# Patient Record
Sex: MALE | Race: WHITE | NOT HISPANIC OR LATINO | Employment: OTHER | URBAN - METROPOLITAN AREA
[De-identification: names, ages, dates, MRNs, and addresses within clinical notes are randomized per-mention and may not be internally consistent; named-entity substitution may affect disease eponyms.]

---

## 2018-03-19 ENCOUNTER — OFFICE VISIT CONVERTED (OUTPATIENT)
Dept: SURGERY | Facility: CLINIC | Age: 53
End: 2018-03-19
Attending: SURGERY

## 2018-03-19 ENCOUNTER — CONVERSION ENCOUNTER (OUTPATIENT)
Dept: SURGERY | Facility: CLINIC | Age: 53
End: 2018-03-19

## 2020-12-01 ENCOUNTER — OFFICE VISIT CONVERTED (OUTPATIENT)
Dept: NEUROSURGERY | Facility: CLINIC | Age: 55
End: 2020-12-01
Attending: PHYSICIAN ASSISTANT

## 2021-05-10 NOTE — H&P
History and Physical      Patient Name: Jacoby Klein   Patient ID: [id number]   Sex: Male   YOB: 1965    Primary Care Provider: [facility]   Referring Provider: Darwin Self    Visit Date: December 1, 2020    Provider: Maeve Angeles PA-C   [facility] [facility]   Location Address: [address] [place] [address]  [id number]   Location Phone: [phone]          Chief Complaint  · Back and left leg pain      History Of Present Illness  The patient is a 55 year old /White male, who presents on referral from Darwin Self, for a neurosurgical evaluation of low back pain and left leg pain.   The left leg pain involves the left foot in a nonspecific distribution. The pain developed gradually around 5-6 years ago after he was moving heavy equipment at work while in  service. It is moderate (3-6/10) in severity, has an aching and a sharp quality and radiates into the left foot in a nonspecific distribution. The pain has been constant. The pain tends to be maximal at no specific time, but waxes and wanes in severity throughout the day. The patient states the pain is aggravated by prolonged standing, walking long distances, and staying in one position for extended periods. No alleviating factors are reported.   He also reports paresthesias down to the foot on the left.   RECENT INTERVENTIONS:  He has been previously treated with physical therapy, chiropractic management, pain medication, and muscle relaxants, and gabapentin. The physical therapy was ineffective in relieving the pain. Chriopractic therapy which is temporarily effective.   INFORMATION REVIEWED:  The following information was reviewed: radiology reports. an MRI of the lumbar spine and from 11/2/20 report describes degenerative changes with left L4/5 foraminal disc protrusion. With multilevel spondylolisthesis. Old T12 compression deformity and L1 hemangioma in the vertebral  body.      He now works as a home health nurse.    Also reports hand paresthesias that wakes him up at night for several months now.       Past Medical History  Allergic rhinitis, chronic; Arthritis; High blood pressure; High cholesterol; Kidney stones; Prostate Disorder; Rectal bleeding; Shortness Of Air         Past Surgical History  EYE SURGERY; Teeth extraction         Medication List  atorvastatin 40 mg oral tablet; cyclobenzaprine 10 mg oral tablet; gabapentin 300 mg oral capsule; Infant's Ibuprofen 50 mg/1.25 mL oral drops,suspension; lisinopril-hydrochlorothiazide 20-12.5 mg oral tablet; methocarbamol 500 mg oral tablet; Mobic 15 mg oral tablet; tramadol 50 mg oral tablet; Ultram 50 mg oral tablet; Uroxatral 10 mg oral tablet extended release 24 hr; Voltaren 1 % topical gel         Allergy List  PENICILLINS       Allergies Reconciled  Family Medical History  Family history of colon cancer; Bladder calculus         Social History  Alcohol (Current some day); Caffeine (Current every day); Second hand smoke exposure (Current some day); Tobacco (Current every day)         Review of Systems  · Constitutional  o Admits  o : fatigue  o Denies  o : chills, excessive sweating, fever, sycope/passing out, weight gain, weight loss  · Eyes  o Denies  o : changes in vision, blurry vision, double vision  · HENT  o Admits  o : ringing in the ears  o Denies  o : loss of hearing, ear aches, sore throat, nasal congestion, sinus pain, nose bleeds, seasonal allergies  · Cardiovascular  o Denies  o : blood clots, swollen legs, anemia, easy burising or bleeding, transfusions  · Respiratory  o Denies  o : shortness of breath, dry cough, productive cough, pneumonia, COPD  · Gastrointestinal  o Denies  o : difficulty swallowing, reflux  · Genitourinary  o Admits  o : incontinence, erectile dysfunction  · Neurologic  o Admits  o : headache, loss of balance, difficulty with sleep, numbness/tingling/paresthesia , difficulty with  "dexterity  o Denies  o : seizure, stroke, tremor, falls, dizziness/vertigo, difficulty with coordination, weakness  · Musculoskeletal  o Admits  o : neck stiffness/pain, muscle aches, joint pain, weakness, spasms, sciatica, pain radiating in leg, low back pain  o Denies  o : swollen lymph nodes, pain radiating in arm  · Endocrine  o Denies  o : diabetes, thyroid disorder  · Psychiatric  o Admits  o : anxiety  o Denies  o : depression  · All Others Negative      Vitals  Date Time BP Position Site L\R Cuff Size HR RR TEMP (F) WT  HT  BMI kg/m2 BSA m2 O2 Sat FR L/min FiO2 HC       12/01/2020 11:18 AM        96.9 175lbs 9oz 5'  7\" 27.5 1.94             Physical Examination  · Constitutional  o Appearance  o : well-nourished, well developed, alert, in no acute distress  · Respiratory  o Respiratory Effort  o : breathing unlabored  · Cardiovascular  o Peripheral Vascular System  o :   § Extremities  § : no edema or cyanosis  · Musculoskeletal  o Spine  o :   § Inspection/Palpation  § : no spinal tenderness or misalignment  o Right Lower Extremity  o :   § Inspection/Palpation  § : no joint or limb tenderness to palpation, no edema present, no ecchymosis  § Joint Stability  § : joint stability within normal limits  § Range of Motion  § : range of motion normal, no joint crepitations present, no pain on motion, Dakota's test negative  o Left Lower Extremity  o :   § Inspection/Palpation  § : no joint or limb tenderness to palpation, no edema present, no ecchymosis  § Joint Stability  § : joint stability within normal limits  § Range of Motion  § : range of motion normal, no joint crepitations present, no pain on motion, Dakota's test negative  · Skin and Subcutaneous Tissue  o Extremities  o :   § Right Lower Extremity  § : no lesions or areas of discoloration  § Left Lower Extremity  § : no lesions or areas of discoloration  o Back  o : no lesions or areas of discoloration  · Neurologic  o Mental Status Examination  o : "   § Orientation  § : alert and oriented to time, person, place and events  o Motor Examination  o :   § RLE Strength  § : strength normal  § RLE Motor Function  § : tone normal, no atrophy, no abnormal movements noted  § LLE Strength  § : strength normal  § LLE Motor Function  § : tone normal, no atrophy, no abnormal movements noted  o Reflexes  o :   § RLE  § : knee and ankle reflexes 2/4, SLR negative  § LLE  § : knee and ankle reflexes 2/4, SLR negative  o Sensation  o :   § Light Touch  § : sensation intact to light touch in extremities  o Gait and Station  o :   § Gait Screening  § : normal gait, able to stand without difficulty  · Psychiatric  o Mood and Affect  o : mood normal, affect appropriate              Assessment  · Acquired spondylolisthesis , lumbar     738.4/M43.19  · Lumbago/low back pain     724.3/M54.40  · Lumbar disc herniation, L4-5     722.10/M51.26  left foraminal disc protrusion   · Radiculopathy, lumbosacral     724.4/M54.16  · Hand paresthesia     782.0/R20.2      Plan  · Orders  o PAIN MANAGEMENT CONSULTATION (PAINM) - 724.3/M54.40, 724.4/M54.16, 722.10/M51.26 - 12/01/2020   refer for a left L4/5 TFESI  · Medications  o Medications have been Reconciled  o Transition of Care or Provider Policy  · Instructions  o Encouraged to follow-up with Primary Care Provider for preventative care.  o The ROS and the PFSH were reviewed at today's visit.  o Call or return to office if symptoms worsen or persist.  o I will refer him for a left L4/5 TFESI and he will obtain another CD ROM with images on it so can review further at f/u visit in 4-6 weeks. If pain improves with TFESI but does not get sustained relief then he may be a candidate for a left L4/5 MID based on report. He will try bilateral wrist splints for hand paresthesias and if not improving I can order an EMG/NCV at the next office visit.   · Referrals  o ID: [id number] Date: 12/01/2020 Type: Inbound  Specialty:  Neurosurgery            Electronically Signed by: Maeve Angeles PA-C -Author on December 1, 2020 12:33:54 PM

## 2021-05-14 VITALS — BODY MASS INDEX: 27.55 KG/M2 | TEMPERATURE: 96.9 F | HEIGHT: 67 IN | WEIGHT: 175.56 LBS

## 2021-05-16 VITALS — HEIGHT: 67 IN | BODY MASS INDEX: 27.94 KG/M2 | RESPIRATION RATE: 16 BRPM | WEIGHT: 178 LBS

## 2021-08-05 ENCOUNTER — OFFICE VISIT (OUTPATIENT)
Dept: NEUROSURGERY | Facility: CLINIC | Age: 56
End: 2021-08-05

## 2021-08-05 VITALS
DIASTOLIC BLOOD PRESSURE: 81 MMHG | BODY MASS INDEX: 29.91 KG/M2 | HEIGHT: 67 IN | WEIGHT: 190.6 LBS | SYSTOLIC BLOOD PRESSURE: 112 MMHG

## 2021-08-05 DIAGNOSIS — M54.42 CHRONIC MIDLINE LOW BACK PAIN WITH LEFT-SIDED SCIATICA: Primary | ICD-10-CM

## 2021-08-05 DIAGNOSIS — G89.29 CHRONIC MIDLINE LOW BACK PAIN WITH LEFT-SIDED SCIATICA: Primary | ICD-10-CM

## 2021-08-05 DIAGNOSIS — M47.817 LUMBOSACRAL SPONDYLOSIS WITHOUT MYELOPATHY: ICD-10-CM

## 2021-08-05 PROBLEM — E78.00 HIGH CHOLESTEROL: Status: ACTIVE | Noted: 2021-08-05

## 2021-08-05 PROBLEM — N42.9 PROSTATE DISORDER: Status: ACTIVE | Noted: 2021-08-05

## 2021-08-05 PROBLEM — I10 HIGH BLOOD PRESSURE: Status: ACTIVE | Noted: 2021-08-05

## 2021-08-05 PROBLEM — M19.90 ARTHRITIS: Status: ACTIVE | Noted: 2021-08-05

## 2021-08-05 PROBLEM — N20.0 KIDNEY STONES: Status: ACTIVE | Noted: 2021-08-05

## 2021-08-05 PROCEDURE — 99212 OFFICE O/P EST SF 10 MIN: CPT | Performed by: NEUROLOGICAL SURGERY

## 2021-08-05 RX ORDER — MULTIVIT WITH MINERALS/LUTEIN
250 TABLET ORAL DAILY
COMMUNITY

## 2021-08-05 RX ORDER — LISINOPRIL 40 MG/1
40 TABLET ORAL DAILY
COMMUNITY

## 2021-08-05 RX ORDER — OXYBUTYNIN CHLORIDE 5 MG/1
5 TABLET ORAL 3 TIMES DAILY
COMMUNITY

## 2021-08-05 RX ORDER — ATORVASTATIN CALCIUM 10 MG/1
20 TABLET, FILM COATED ORAL DAILY
COMMUNITY

## 2021-08-05 RX ORDER — CETIRIZINE HYDROCHLORIDE 10 MG/1
10 TABLET ORAL DAILY PRN
COMMUNITY

## 2021-08-05 RX ORDER — CYCLOBENZAPRINE HCL 5 MG
5 TABLET ORAL 3 TIMES DAILY PRN
COMMUNITY

## 2021-08-05 RX ORDER — BIOTIN 5 MG
TABLET ORAL
COMMUNITY

## 2021-08-05 RX ORDER — MELOXICAM 15 MG/1
15 TABLET ORAL DAILY
COMMUNITY

## 2021-08-05 RX ORDER — LIDOCAINE 50 MG/G
1 PATCH TOPICAL EVERY 24 HOURS
COMMUNITY

## 2021-08-05 RX ORDER — METHOCARBAMOL 500 MG/1
500 TABLET, FILM COATED ORAL 2 TIMES DAILY
COMMUNITY

## 2021-08-05 RX ORDER — GABAPENTIN 300 MG/1
300 CAPSULE ORAL
COMMUNITY

## 2021-08-05 RX ORDER — UREA 10 %
3 LOTION (ML) TOPICAL
COMMUNITY

## 2021-08-05 RX ORDER — TAMSULOSIN HYDROCHLORIDE 0.4 MG/1
1 CAPSULE ORAL DAILY
COMMUNITY

## 2021-08-05 RX ORDER — TRAMADOL HYDROCHLORIDE 50 MG/1
50 TABLET ORAL EVERY 6 HOURS PRN
COMMUNITY

## 2021-08-05 RX ORDER — SILDENAFIL 50 MG/1
50 TABLET, FILM COATED ORAL DAILY PRN
COMMUNITY

## 2021-08-05 NOTE — PROGRESS NOTES
Jacoby Klein is a 55 y.o. male that presents with No chief complaint on file.       He has continued lower back pain. After 48 hours, he got about 10 days of relief. He has some pain in the left leg which is less than the lower back. The leg is numb all the way to the foot.       Review of Systems   Musculoskeletal: Positive for back pain, myalgias and neck pain.   Neurological: Positive for numbness.   All other systems reviewed and are negative.       Vitals:    08/05/21 1417   BP: 112/81        Physical Exam  Cardiovascular:      Comments: No edema  Pulmonary:      Effort: Pulmonary effort is normal.   Musculoskeletal:      Comments: SLR on the left causes some numbness    Neurological:      Mental Status: He is alert.      Sensory: No sensory deficit.      Motor: No weakness.             Assessment and Plan {CC Problem List  Visit Diagnosis  ROS  Review (Popup)  [employer] Maintenance  Quality  BestPractice  Medications  SmartSets  SnapShot Encounters  Media :23}   Problem List Items Addressed This Visit     None      Visit Diagnoses     Chronic midline low back pain with left-sided sciatica    -  Primary    Lumbosacral spondylosis without myelopathy        Multilevel        He would potentially benefit from a facet injection/ablation for the lower back pain.     We discussed the importance of smoking/nicotine cessation. Smoking/nicotine use has multiple health risks. In particular related to the spine, nicotine increases the incidence of lower back pain, speeds up the progression of degenerative disc disease and dramatically reduces healing after spine surgery (particularly a fusion operation).     We discussed the importance of core strengthening, avoidance of activities that worsen the pain, nicotine cessation and maintenance of healthy weight. Surgery for patients with multilevel degenerative disc disease is not typically successful with the risks outweighing the benefit.       Follow Up  {Instructions Charge Capture  Follow-up Communications :23}   No follow-ups on file.

## 2022-04-02 ENCOUNTER — HOSPITAL ENCOUNTER (EMERGENCY)
Facility: HOSPITAL | Age: 57
Discharge: HOME | End: 2022-04-03
Attending: EMERGENCY MEDICINE

## 2022-04-02 DIAGNOSIS — I95.9 HYPOTENSION, UNSPECIFIED HYPOTENSION TYPE: Primary | ICD-10-CM

## 2022-04-02 DIAGNOSIS — N28.9 ACUTE RENAL INSUFFICIENCY: ICD-10-CM

## 2022-04-02 PROCEDURE — 36415 COLL VENOUS BLD VENIPUNCTURE: CPT

## 2022-04-02 PROCEDURE — 93005 ELECTROCARDIOGRAM TRACING: CPT

## 2022-04-02 PROCEDURE — 85025 COMPLETE CBC W/AUTO DIFF WBC: CPT | Performed by: EMERGENCY MEDICINE

## 2022-04-02 PROCEDURE — 96361 HYDRATE IV INFUSION ADD-ON: CPT

## 2022-04-02 PROCEDURE — 84484 ASSAY OF TROPONIN QUANT: CPT | Performed by: EMERGENCY MEDICINE

## 2022-04-02 PROCEDURE — 80053 COMPREHEN METABOLIC PANEL: CPT | Performed by: EMERGENCY MEDICINE

## 2022-04-02 PROCEDURE — 83735 ASSAY OF MAGNESIUM: CPT | Performed by: EMERGENCY MEDICINE

## 2022-04-02 PROCEDURE — 99284 EMERGENCY DEPT VISIT MOD MDM: CPT

## 2022-04-02 PROCEDURE — 96374 THER/PROPH/DIAG INJ IV PUSH: CPT

## 2022-04-02 PROCEDURE — 93005 ELECTROCARDIOGRAM TRACING: CPT | Performed by: EMERGENCY MEDICINE

## 2022-04-02 RX ORDER — SODIUM CHLORIDE 0.9 % (FLUSH) 0.9 %
10 SYRINGE (ML) INJECTION AS NEEDED
Status: DISCONTINUED | OUTPATIENT
Start: 2022-04-02 | End: 2022-04-03 | Stop reason: HOSPADM

## 2022-04-03 ENCOUNTER — APPOINTMENT (OUTPATIENT)
Dept: GENERAL RADIOLOGY | Facility: HOSPITAL | Age: 57
End: 2022-04-03

## 2022-04-03 VITALS
WEIGHT: 172.84 LBS | DIASTOLIC BLOOD PRESSURE: 76 MMHG | OXYGEN SATURATION: 93 % | SYSTOLIC BLOOD PRESSURE: 105 MMHG | RESPIRATION RATE: 18 BRPM | BODY MASS INDEX: 26.2 KG/M2 | TEMPERATURE: 97.6 F | HEART RATE: 79 BPM | HEIGHT: 68 IN

## 2022-04-03 LAB
ALBUMIN SERPL-MCNC: 4.2 G/DL (ref 3.5–5.2)
ALBUMIN/GLOB SERPL: 2.6 G/DL
ALP SERPL-CCNC: 56 U/L (ref 39–117)
ALT SERPL W P-5'-P-CCNC: 17 U/L (ref 1–41)
ANION GAP SERPL CALCULATED.3IONS-SCNC: 13.2 MMOL/L (ref 5–15)
AST SERPL-CCNC: 15 U/L (ref 1–40)
BASOPHILS # BLD AUTO: 0.01 10*3/MM3 (ref 0–0.2)
BASOPHILS NFR BLD AUTO: 0.1 % (ref 0–1.5)
BILIRUB SERPL-MCNC: 1.8 MG/DL (ref 0–1.2)
BILIRUB UR QL STRIP: NEGATIVE
BUN SERPL-MCNC: 19 MG/DL (ref 6–20)
BUN/CREAT SERPL: 13.7 (ref 7–25)
CALCIUM SPEC-SCNC: 9.2 MG/DL (ref 8.6–10.5)
CHLORIDE SERPL-SCNC: 101 MMOL/L (ref 98–107)
CLARITY UR: CLEAR
CO2 SERPL-SCNC: 24.8 MMOL/L (ref 22–29)
COLOR UR: YELLOW
CREAT SERPL-MCNC: 1.39 MG/DL (ref 0.76–1.27)
DEPRECATED RDW RBC AUTO: 40.3 FL (ref 37–54)
EGFRCR SERPLBLD CKD-EPI 2021: 60.2 ML/MIN/1.73
EOSINOPHIL # BLD AUTO: 0.08 10*3/MM3 (ref 0–0.4)
EOSINOPHIL NFR BLD AUTO: 0.8 % (ref 0.3–6.2)
ERYTHROCYTE [DISTWIDTH] IN BLOOD BY AUTOMATED COUNT: 11.8 % (ref 12.3–15.4)
GLOBULIN UR ELPH-MCNC: 1.6 GM/DL
GLUCOSE SERPL-MCNC: 172 MG/DL (ref 65–99)
GLUCOSE UR STRIP-MCNC: ABNORMAL MG/DL
HCT VFR BLD AUTO: 40.5 % (ref 37.5–51)
HGB BLD-MCNC: 14.1 G/DL (ref 13–17.7)
HGB UR QL STRIP.AUTO: NEGATIVE
HOLD SPECIMEN: NORMAL
HOLD SPECIMEN: NORMAL
IMM GRANULOCYTES # BLD AUTO: 0.03 10*3/MM3 (ref 0–0.05)
IMM GRANULOCYTES NFR BLD AUTO: 0.3 % (ref 0–0.5)
KETONES UR QL STRIP: NEGATIVE
LEUKOCYTE ESTERASE UR QL STRIP.AUTO: NEGATIVE
LYMPHOCYTES # BLD AUTO: 1.4 10*3/MM3 (ref 0.7–3.1)
LYMPHOCYTES NFR BLD AUTO: 14.3 % (ref 19.6–45.3)
MAGNESIUM SERPL-MCNC: 2 MG/DL (ref 1.6–2.6)
MCH RBC QN AUTO: 32.2 PG (ref 26.6–33)
MCHC RBC AUTO-ENTMCNC: 34.8 G/DL (ref 31.5–35.7)
MCV RBC AUTO: 92.5 FL (ref 79–97)
MONOCYTES # BLD AUTO: 0.65 10*3/MM3 (ref 0.1–0.9)
MONOCYTES NFR BLD AUTO: 6.6 % (ref 5–12)
NEUTROPHILS NFR BLD AUTO: 7.64 10*3/MM3 (ref 1.7–7)
NEUTROPHILS NFR BLD AUTO: 77.9 % (ref 42.7–76)
NITRITE UR QL STRIP: NEGATIVE
NRBC BLD AUTO-RTO: 0 /100 WBC (ref 0–0.2)
PH UR STRIP.AUTO: 7 [PH] (ref 5–8)
PLATELET # BLD AUTO: 176 10*3/MM3 (ref 140–450)
PMV BLD AUTO: 9.3 FL (ref 6–12)
POTASSIUM SERPL-SCNC: 3.8 MMOL/L (ref 3.5–5.2)
PROT SERPL-MCNC: 5.8 G/DL (ref 6–8.5)
PROT UR QL STRIP: NEGATIVE
QT INTERVAL: 398 MS
RBC # BLD AUTO: 4.38 10*6/MM3 (ref 4.14–5.8)
SODIUM SERPL-SCNC: 139 MMOL/L (ref 136–145)
SP GR UR STRIP: 1.01 (ref 1–1.03)
TROPONIN T SERPL-MCNC: <0.01 NG/ML (ref 0–0.03)
UROBILINOGEN UR QL STRIP: ABNORMAL
WBC NRBC COR # BLD: 9.81 10*3/MM3 (ref 3.4–10.8)
WHOLE BLOOD HOLD SPECIMEN: NORMAL
WHOLE BLOOD HOLD SPECIMEN: NORMAL

## 2022-04-03 PROCEDURE — 25010000002 KETOROLAC TROMETHAMINE PER 15 MG: Performed by: EMERGENCY MEDICINE

## 2022-04-03 PROCEDURE — 81003 URINALYSIS AUTO W/O SCOPE: CPT | Performed by: EMERGENCY MEDICINE

## 2022-04-03 PROCEDURE — 71045 X-RAY EXAM CHEST 1 VIEW: CPT

## 2022-04-03 PROCEDURE — 93010 ELECTROCARDIOGRAM REPORT: CPT | Performed by: INTERNAL MEDICINE

## 2022-04-03 RX ORDER — KETOROLAC TROMETHAMINE 30 MG/ML
30 INJECTION, SOLUTION INTRAMUSCULAR; INTRAVENOUS ONCE
Status: COMPLETED | OUTPATIENT
Start: 2022-04-03 | End: 2022-04-03

## 2022-04-03 RX ADMIN — SODIUM CHLORIDE, POTASSIUM CHLORIDE, SODIUM LACTATE AND CALCIUM CHLORIDE 2352 ML: 600; 310; 30; 20 INJECTION, SOLUTION INTRAVENOUS at 00:54

## 2022-04-03 RX ADMIN — KETOROLAC TROMETHAMINE 30 MG: 30 INJECTION, SOLUTION INTRAMUSCULAR; INTRAVENOUS at 01:21

## 2022-04-03 NOTE — ED NOTES
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  Pt had BP checked, pressure was 60 systolic.      EMS reports initial BP 50s systolic, last  systolic.  Pt received 500mL NS per EMS during transport.    Pt reports \"slight\" dizziness upon arrival to ED, is awake, alert, no neuro deficits noted upon arrival to ED.  "

## 2022-04-03 NOTE — ED PROVIDER NOTES
Time: 12:39 AM EDT  Arrived by: ambulance  Chief Complaint: Lightheadedness    History of Present Illness:  Patient is a 56 y.o. year old male that presents to the emergency department with lightheadedness    Patient states he is in his normal state of health until just before arrival.  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  He is able to make it back to the nursing station place his head between his legs due to the extreme lightheadedness and weakness.  Colleague checked his blood pressure and found it to be 50/30.  He denies any chest pain.  No vomiting nausea or diarrhea.  No fevers or chills.  No abdominal pain or discomfort.  Patient admits 1 mildly bloody bowel movement several days ago but none since that time.  He states he takes blood pressure medication and typically takes it right before work.  He does not believe he took an extra dose or an extra pill today.      History provided by:  Patient      Similar Symptoms Previously: No  Recently seen: No      Patient Care Team  Primary Care Provider: Provider, No Known    Past Medical History:     Allergies   Allergen Reactions   • Penicillins Rash     Past Medical History:   Diagnosis Date   • Arthritis    • Chronic allergic rhinitis    • HBP (high blood pressure)    • High cholesterol    • Kidney stones    • Prostate disorder    • Rectal bleeding    • SOB (shortness of breath)      Past Surgical History:   Procedure Laterality Date   • EYE SURGERY     • TEETH EXTRACTION       Family History   Problem Relation Age of Onset   • Other Mother         BLADDER CALCULUS   • Colon cancer Maternal Grandfather 60       Home Medications:  Prior to Admission medications    Medication Sig Start Date End Date Taking? Authorizing Provider   atorvastatin (LIPITOR) 10 MG tablet Take 10 mg by mouth Daily.    Provider, MD Satnam   cetirizine (zyrTEC) 10 MG tablet Take 10 mg by mouth Daily  As Needed for Allergies.    Satnam Cast MD   cyclobenzaprine (FLEXERIL) 5 MG tablet Take 5 mg by mouth 3 (Three) Times a Day As Needed for Muscle Spasms.    Satnam Cast MD   gabapentin (NEURONTIN) 300 MG capsule Take 300 mg by mouth. 1 tablet qam, then 2 tablets qhs    aStnam Cast MD   Krill Oil 1000 MG capsule Take  by mouth.    Satnam Cast MD   lidocaine (LIDODERM) 5 % Place 1 patch on the skin as directed by provider Daily. Remove & Discard patch within 12 hours or as directed by MD    Satnam Cast MD   lisinopril (PRINIVIL,ZESTRIL) 40 MG tablet Take 40 mg by mouth Daily.    Satnam Cast MD   melatonin 1 MG tablet Take  by mouth.    Satnam Cast MD   meloxicam (MOBIC) 15 MG tablet Take 15 mg by mouth Daily.    Satnam Cast MD   methocarbamol (ROBAXIN) 500 MG tablet Take 500 mg by mouth 2 (two) times a day.    Satnam Cast MD   Multiple Vitamins-Minerals (MULTIVITAMIN ADULT EXTRA C PO) Take  by mouth.    Satnam Cast MD   oxybutynin (DITROPAN) 5 MG tablet Take 5 mg by mouth 3 (Three) Times a Day.    Satnam Cast MD   sildenafil (VIAGRA) 50 MG tablet Take 50 mg by mouth Daily As Needed for Erectile Dysfunction.    Satnam Cast MD   tamsulosin (FLOMAX) 0.4 MG capsule 24 hr capsule Take 1 capsule by mouth Daily.    Satnam Cast MD   traMADol (ULTRAM) 50 MG tablet Take 50 mg by mouth Every 6 (Six) Hours As Needed for Moderate Pain .    Satnam Cast MD   vitamin C (ASCORBIC ACID) 250 MG tablet Take 250 mg by mouth Daily.    Satnam Cast MD        Social History:   Social History     Tobacco Use   • Smoking status: Current Every Day Smoker     Types: Cigars   • Tobacco comment: SMOKE 4 CIGARETTES PER DAY SMOKING SINCE AGE 17   Substance Use Topics   • Alcohol use: Yes     Comment: DRINKS WINE BEER AND LIQUOR MONTHY       Record Review:  I have reviewed the patient's records in [facility].  "    Review of Systems:  Review of Systems   Constitutional: Positive for fatigue. Negative for chills and fever.   HENT: Negative for congestion, ear pain and sore throat.    Eyes: Negative for pain.   Respiratory: Negative for cough, chest tightness and shortness of breath.    Cardiovascular: Negative for chest pain.   Gastrointestinal: Negative for abdominal pain, diarrhea, nausea and vomiting.   Genitourinary: Negative for flank pain and hematuria.   Musculoskeletal: Negative for joint swelling.   Skin: Negative for pallor.   Neurological: Positive for weakness and light-headedness. Negative for seizures and headaches.   All other systems reviewed and are negative.       Physical Exam:  /76   Pulse 79   Temp 97.6 °F (36.4 °C) (Oral)   Resp 18   Ht 172.7 cm (68\")   Wt 78.4 kg (172 lb 13.5 oz)   SpO2 93%   BMI 26.28 kg/m²     Physical Exam  Vitals and nursing note reviewed.   Constitutional:       General: He is not in acute distress.     Appearance: Normal appearance. He is not toxic-appearing.   HENT:      Head: Normocephalic and atraumatic.      Jaw: There is normal jaw occlusion.   Eyes:      General: Lids are normal.      Extraocular Movements: Extraocular movements intact.      Conjunctiva/sclera: Conjunctivae normal.      Pupils: Pupils are equal, round, and reactive to light.   Cardiovascular:      Rate and Rhythm: Normal rate and regular rhythm.      Pulses: Normal pulses.      Heart sounds: Normal heart sounds.   Pulmonary:      Effort: Pulmonary effort is normal. No respiratory distress.      Breath sounds: Normal breath sounds. No wheezing or rhonchi.   Abdominal:      General: Abdomen is flat.      Palpations: Abdomen is soft.      Tenderness: There is no abdominal tenderness. There is no guarding or rebound.   Musculoskeletal:         General: Normal range of motion.      Cervical back: Normal range of motion and neck supple.      Right lower leg: No edema.      Left lower leg: No edema. "   Skin:     General: Skin is warm and dry.   Neurological:      Mental Status: He is alert and oriented to person, place, and time. Mental status is at baseline.   Psychiatric:         Mood and Affect: Mood normal.                Medications in the Emergency Department:  Medications   lactated ringers bolus 2,352 mL (0 mL/kg × 78.4 kg Intravenous Stopped 4/3/22 0239)   ketorolac (TORADOL) injection 30 mg (30 mg Intravenous Given 4/3/22 0121)        Labs  Lab Results (last 24 hours)     Procedure Component Value Units Date/Time    CBC & Differential [[id number]]  (Abnormal) Collected: 04/02/22 2355    Specimen: Blood Updated: 04/03/22 0008    Narrative:      The following orders were created for panel order CBC & Differential.  Procedure                               Abnormality         Status                     ---------                               -----------         ------                     CBC Auto Differential[[id number]]        Abnormal            Final result                 Please view results for these tests on the individual orders.    Comprehensive Metabolic Panel [[id number]]  (Abnormal) Collected: 04/02/22 2355    Specimen: Blood Updated: 04/03/22 0026     Glucose 172 mg/dL      BUN 19 mg/dL      Creatinine 1.39 mg/dL      Sodium 139 mmol/L      Potassium 3.8 mmol/L      Chloride 101 mmol/L      CO2 24.8 mmol/L      Calcium 9.2 mg/dL      Total Protein 5.8 g/dL      Albumin 4.20 g/dL      ALT (SGPT) 17 U/L      AST (SGOT) 15 U/L      Alkaline Phosphatase 56 U/L      Total Bilirubin 1.8 mg/dL      Globulin 1.6 gm/dL      A/G Ratio 2.6 g/dL      BUN/Creatinine Ratio 13.7     Anion Gap 13.2 mmol/L      eGFR 60.2 mL/min/1.73      Comment: National Kidney Foundation and American Society of Nephrology (ASN) Task Force recommended calculation based on the Chronic Kidney Disease Epidemiology Collaboration (CKD-EPI) equation refit without adjustment for race.       Narrative:      GFR Normal >60  Chronic Kidney  Disease <60  Kidney Failure <15      Troponin [[id number]]  (Normal) Collected: 04/02/22 2355    Specimen: Blood Updated: 04/03/22 0026     Troponin T <0.010 ng/mL     Narrative:      Troponin T Reference Range:  <= 0.03 ng/mL-   Negative for AMI  >0.03 ng/mL-     Abnormal for myocardial necrosis.  Clinicians would have to utilize clinical acumen, EKG, Troponin and serial changes to determine if it is an Acute Myocardial Infarction or myocardial injury due to an underlying chronic condition.       Results may be falsely decreased if patient taking Biotin.      Magnesium [[id number]]  (Normal) Collected: 04/02/22 2355    Specimen: Blood Updated: 04/03/22 0026     Magnesium 2.0 mg/dL     CBC Auto Differential [[id number]]  (Abnormal) Collected: 04/02/22 2355    Specimen: Blood Updated: 04/03/22 0008     WBC 9.81 10*3/mm3      RBC 4.38 10*6/mm3      Hemoglobin 14.1 g/dL      Hematocrit 40.5 %      MCV 92.5 fL      MCH 32.2 pg      MCHC 34.8 g/dL      RDW 11.8 %      RDW-SD 40.3 fl      MPV 9.3 fL      Platelets 176 10*3/mm3      Neutrophil % 77.9 %      Lymphocyte % 14.3 %      Monocyte % 6.6 %      Eosinophil % 0.8 %      Basophil % 0.1 %      Immature Grans % 0.3 %      Neutrophils, Absolute 7.64 10*3/mm3      Lymphocytes, Absolute 1.40 10*3/mm3      Monocytes, Absolute 0.65 10*3/mm3      Eosinophils, Absolute 0.08 10*3/mm3      Basophils, Absolute 0.01 10*3/mm3      Immature Grans, Absolute 0.03 10*3/mm3      nRBC 0.0 /100 WBC     Urinalysis With Microscopic If Indicated (No Culture) - Urine, Clean Catch [[id number]]  (Abnormal) Collected: 04/03/22 0201    Specimen: Urine, Clean Catch Updated: 04/03/22 0208     Color, UA Yellow     Appearance, UA Clear     pH, UA 7.0     Specific Gravity, UA 1.009     Glucose,  mg/dL (2+)     Ketones, UA Negative     Bilirubin, UA Negative     Blood, UA Negative     Protein, UA Negative     Leuk Esterase, UA Negative     Nitrite, UA Negative     Urobilinogen, UA 0.2 E.U./dL     Narrative:      Urine microscopic not indicated.           Imaging:  XR Chest 1 View    Result Date: 4/3/2022  PROCEDURE: XR CHEST 1 VW  COMPARISON: [facility], CR, CHEST PA/AP & LAT 2V, 5/28/2019, 11:31.  INDICATIONS: WEAKNESS, DIZZINESS, SHORTNESS OF BREATH  FINDINGS:  The cardiomediastinal silhouette is within normal limits. The lungs are clear. There is no focal consolidation, pneumothorax or large pleural effusion.        No acute cardiopulmonary process.       BAL FREITAS MD       Electronically Signed and Approved By: BAL FREITAS MD on 4/03/2022 at 0:14               Procedures:  Procedures    Progress  ED Course as of 04/03/22 0730   Sun Apr 03, 2022   0039 EKG: Sinus rhythm 75, normal P wave, normal QRS, normal ST segment, normal QT, no comparison [JS]      ED Course User Index  [JS] Altaf Giron MD                            Medical Decision Making:  MDM   Patient's blood pressure improved with IV fluid hydration in the emergency department.  The patient´s CBC was reviewed and shows no abnormalities of critical concern. Of note, there is no anemia requiring a blood transfusion and the platelet count is acceptable.  The patient´s CMP was reviewed and shows no abnormalities of critical concern. Of note, the patient´s sodium and potassium are acceptable. The patient´s liver enzymes are unremarkable. The patient´s renal function (creatinine) is preserved. The patient has a normal anion gap.  We will hold the patient's antihypertensive medication until follow-up.  We discussed return precautions including worsening symptoms or any additional concerns.      Final diagnoses:   Hypotension, unspecified hypotension type   Acute renal insufficiency        Disposition:  ED Disposition     ED Disposition   Discharge    Condition   Stable    Comment   --             Dictated Utilizing Dragon Dictation    Documentation assistance provided by Altaf Giron MD acting as scribe for Altaf Giron MD.  Information recorded by the scribe was done at my direction and has been verified and validated by me.        Altaf Giron MD  04/03/22 7167

## 2023-09-08 ENCOUNTER — OFFICE VISIT (OUTPATIENT)
Dept: SLEEP MEDICINE | Facility: HOSPITAL | Age: 58
End: 2023-09-08
Payer: OTHER GOVERNMENT

## 2023-09-08 VITALS
WEIGHT: 178.5 LBS | DIASTOLIC BLOOD PRESSURE: 83 MMHG | BODY MASS INDEX: 27.05 KG/M2 | SYSTOLIC BLOOD PRESSURE: 120 MMHG | HEART RATE: 102 BPM | HEIGHT: 68 IN | OXYGEN SATURATION: 92 %

## 2023-09-08 DIAGNOSIS — I10 ESSENTIAL HYPERTENSION: ICD-10-CM

## 2023-09-08 DIAGNOSIS — F43.10 PTSD (POST-TRAUMATIC STRESS DISORDER): ICD-10-CM

## 2023-09-08 DIAGNOSIS — G47.19 EXCESSIVE DAYTIME SLEEPINESS: ICD-10-CM

## 2023-09-08 DIAGNOSIS — R06.83 SNORING: ICD-10-CM

## 2023-09-08 DIAGNOSIS — R06.81 WITNESSED EPISODE OF APNEA: ICD-10-CM

## 2023-09-08 DIAGNOSIS — R29.818 SUSPECTED SLEEP APNEA: Primary | ICD-10-CM

## 2023-09-08 DIAGNOSIS — G89.29 OTHER CHRONIC PAIN: ICD-10-CM

## 2023-09-08 PROCEDURE — G0463 HOSPITAL OUTPT CLINIC VISIT: HCPCS

## 2023-09-08 RX ORDER — PRAZOSIN HYDROCHLORIDE 2 MG/1
2 CAPSULE ORAL NIGHTLY
COMMUNITY

## 2023-09-08 RX ORDER — CYCLOBENZAPRINE HCL 10 MG
TABLET ORAL
COMMUNITY
Start: 2023-08-07

## 2023-09-08 RX ORDER — VENLAFAXINE 75 MG/1
75 TABLET ORAL 2 TIMES DAILY
COMMUNITY

## 2023-09-08 RX ORDER — GABAPENTIN 600 MG/1
TABLET ORAL
COMMUNITY
Start: 2023-08-07

## 2023-09-08 NOTE — PROGRESS NOTES
[facility]  [address] [place] [address]  [place]   [place] [address]  Phone: [phone]  Fax: [phone]      Jacoby Klein  [id number]   1965  57 y.o.  male      Referring physician/provider and PCP Zion Larios DO    Type of service: Initial Sleep Medicine Consult.  Date of service: 9/8/2023      Chief Complaint   Patient presents with    Witnessed Apnea       History of present illness;  Thank you for asking to see Jacoby Klein, 57 y.o. PMHx of HTN, PTSD, Chronic pain/Arthritis (on tramadol) The patient was seen today on 9/8/2023 at [facility].  The patient presents today with symptoms of snoring, non-restorative sleep and witnessed apneas. The symptoms are present for >1 year and they are persistent in nature.  The snoring is present in all positions and it is loud.  Patient  denies prior surgery namely tonsillectomy, nasal surgery or UPPP.     -[facility]   -loud snoring loud wife wakes him up , witnessed apneas by wife, excessive daytime sleepiness   -States he was diagnosed with sleep apnea unable to specify further severity or type of sleep apnea. Was prescribed CPAP he was not able to tolerate full face mask. Hasn't used this CPAP which is > 5 years old in over 1 year not sure if it still works.  UNABLE TO PROVIDE his last sleep study to us.  -Since last sleep study, lost 20 lbs   -On tramadol for chronic pain for 3 years takes nightly 50 mg   -On for chronic back pain (300 mg qam, 300 mg midday, and 600 pm)      Obstructive Sleep Apnea Screening: STOP-BANG Sleep Apnea Questionnaire. Reference: Silvestre F et al. Br J Anaesth, 2012.    Criterion    Yes    No  Do you SNORE loudly?    [x ]    [ ]  Do you often feel TIRED, fatigued, or sleepy during the day?    [ x]    [ ]  Has anyone OBSERVED you stop breathing during your sleep?    [ x]    [ ]  Do you have or are you being treated for high blood PRESSURE?     "[ x]    [ ]  BMI >32 kg/m2    [ ]    [ x]  AGE > 50 years    [ x]    [ ]  NECK circumference >16 inches / 40 cm    [ ]    [ x]  GENDER: male    [ x]    [ ]    KATHIA Probability:  [ ] 1-2 - low  [ ] 3-4 - intermediate  [x ] 5-8 - high      Further Sleep History:    Bedtime: weekdays 8 am Weekends 8 am   Rise Time: 4:15 pm weekends and weekdays  Sleep Latency: 10 to 20 minutes -  takes 6 mg melatonin 20-30 minutes   Screens in bed:  Yes plays games in bed and tv in bed  Wake after sleep onset: 2-3x   Reasons for awakenings: nocturia   Number of naps per day: 1 hour to 1.5 hours, naps 1-2 days a week  Naps restorative: Feels the same after   Caffeine use: not often 4-5 hours before bed 1 coffee or 1 soda      RLS Symptoms: No   Bruxism: Yes - talked to dentist, no mouth guard yet states observation by dentist   Current sleep related gastroesophageal reflux symptoms:  No   Cataplexy:  No   Sleep Paralysis:   Yes - once a month is on effexor   Hypnagogic or hypnopompic hallucinations: No   Parasomnias such as sleep walking or sleep eating No         MEDICAL CONDITIONS (PMH)   Hypertension  Chronic pain  PTSD  Anxiety  Depression  Migraines  Arthritis    Social history:  Do you drive a commercial vehicle:  No   Shift work:  Yes  - 7pm to 7 am (denies near miss or MVC secondary to sleepiness, denies replaced injury due to dozing off)  Tobacco use: Former less than 1 pack/day from age [age]  Alcohol use: denies   Occupation: Nurse      Family Hx (parents and siblings) (pertaining to sleep medicine)  Sleep apnea  Thyroid disorder  Obesity    Medications: reviewed    Review of systems:  Positive symptoms are :  Snoring  Witnessed apnea  Daytime excessive sleepiness with [place] Sleepiness Scale of Total score: 15   Fatigue         Physical exam:  Vitals:    09/08/23 0900   BP: 120/83   Pulse: 102   SpO2: 92%   Weight: 81 kg (178 lb 8 oz)   Height: 172.7 cm (68\")    Body mass index is 27.14 kg/m².   CONSTITUTIONAL:  Non-toxic, " In no overt distress   Head: normocephalic   ENT: Mallampati class III, NO macroglossia, no septal defects   NECK:Neck Circumference: 15 inches,no nuchal rigidity  RESPIRATORY SYSTEM: Breath sounds are clear (no rales, no rhonchi, no wheezes), no accessory muscle use  CARDIOVASULAR SYSTEM: Heart sounds are regular rhythm and normal rate, no rub, no gallop, no edema  NEUROLOGICAL SYSTEM: Oriented x 3, No gross focal deficits   PSYCHIATRIC SYSTEM: Goal oriented, affect full range appropriate      Assessment and plan:  Suspected sleep apnea [R29.818] patient's symptoms and examination is consistent with sleep apnea (G47.30). I have talked to the patient about the signs and symptoms of sleep apnea. In addition, I have also discussed pathophysiology of sleep apnea.  I also discussed the complications of untreated sleep apnea including effects on hypertension, diabetes mellitus and nonrestorative sleep with hypersomnia which can increase risk for motor vehicle accidents.  Untreated sleep apnea is also a risk factor for development of atrial fibrillation, hypertension, insulin resistance and cerebrovascular accident.  Discussed in detail of various testing methods including home-based and lab based sleep studies.  Based on history and physical examination and other comorbidities the most appropriate study is to order SPLIT AHI > 15 in laboratory polysomnography, rather than home sleep apnea testing,to rule out diagnosis of sleep apnea per AASM clinical practice guidelines (doi:10.5664/jcsm.6506) secondary to patient’s history of  chronic opioid medication use. The order for the sleep study is placed in [facility].  The test will be scheduled after approval from insurance. Treatment and management will be discussed after the test is completed. High pre-test probability STOP-BANG 5/8; however, Mallampati III with no macroglossia will need an in lab polysomnography to definitively rule out sleep apnea especially with 20 lbs weight  loss since last sleep study (no prior sleep study available for review).  Patient must normalize his sleep schedule he was thoroughly counseled same, schedule days off from work leading up to sleep study to normalize sleep schedule, no caffeine day of sleep study, no naps day of sleep study. [facility]. Patient was given opportunity to ask questions and all the questions were answered.   Snoring (R06.83), snoring is the sound created by turbulent airflow vibrating upper airway soft tissue due to limitation of inspiratory airflow. I have also discussed factors affecting snoring including sleep deprivation, sleeping on the back and alcohol ingestion. To minimize snoring, patient is advised to have adequate sleep, sleep on the side and avoid alcohol and sedative medications before bedtime  Daytime excessive sleepiness .  It was assessed with [place] Sleepiness Scale of Total score: 15.  There are many causes for daytime excessive sleepiness including sleep depression, shiftwork syndrome, depression and other medical disorders including heart, kidney and liver failure.  The most serious cause of excessive sleepiness is due to neurological conditions like narcolepsy/cataplexy.  But the most common cause of excessive sleepiness is due to sleep apnea with frequent awakenings during sleep time.  I have discussed safety of driving and to remain vigilant while driving.   Overweight, patient's BMI is Body mass index is 27.14 kg/m².. I have discussed the relationship between weight and sleep apnea.There is direct correlation between weight and severity of sleep apnea.  Weight reduction is encouraged, as it is going to reduce the severity of sleep apnea. I have also discussed with the patient diet and exercise to achieve ideal body weight.  Chronic pain,  On opoid therapy with tramadol 50 qhs this is an indication for in lab polysomnography. At risk for central sleep apnea with opoid therapy  risk/benefits discussion of this medication for him to discuss with prescribing physician.Follow up with prescribing physician for management. Also on gabapentin TID regimen for chronic pain this may add to hypersomnolence if no sleep apnea is found on in lab PSG must follow up with prescribing physician for management of this medication regimen risk/benefits discussion with prescribing physician.  HTN Follow up with PCP. This medical condition would make the patient eligible for a trial of PAP therapy even if sleep study reveals mild severity sleep apnea.  PTSD on prazosin for same follow up with prescribing physician for serial management.   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 Counseled safety. No driving or operating heavy machinery while sleepy. Does not meet shift work disorder. Normalize sleep schedule prior to in lab PSG thorough counseling for same today.     I have also discussed with the patient the following  Sleep hygiene: Maintaining a regular bedtime and wake time, not to watch television or work in bed, limit caffeine-containing beverages before bed time and avoid naps during the day  Adequate amount of sleep.  Generally most people needs about 7 to 8 hours of sleep.      Dispo: [facility] patient recommendations only no order and patient to pickup sleep study for [facility] if in lab SPLIT shows sleep apnea and PAP therapy needed.     Return for 31 to 90 days after PAP setup with down load.  Patient's questions were answered      I once again thank you for asking me to see this patient in consultation and I have forwarded my opinion and treatment plan.  Please do not hesitate to call me if you have any questions.       EMR Dragon/Transcription disclaimer:   Much of this encounter note is an electronic transcription/translation of spoken language to printed text. The electronic translation of spoken language may permit erroneous, or at times, nonsensical words or phrases to be inadvertently transcribed; Although I  have reviewed the note for such errors, some may still exist.     NPI #: [id number]    Chana Blackmon, DO  Sleep Medicine  [facility]  09/08/23

## 2023-10-24 ENCOUNTER — HOSPITAL ENCOUNTER (OUTPATIENT)
Dept: SLEEP MEDICINE | Facility: HOSPITAL | Age: 58
End: 2023-10-24
Payer: OTHER GOVERNMENT

## 2023-10-24 DIAGNOSIS — G47.19 EXCESSIVE DAYTIME SLEEPINESS: ICD-10-CM

## 2023-10-24 DIAGNOSIS — R29.818 SUSPECTED SLEEP APNEA: ICD-10-CM

## 2023-10-24 DIAGNOSIS — G89.29 OTHER CHRONIC PAIN: ICD-10-CM

## 2023-10-24 DIAGNOSIS — R06.83 SNORING: ICD-10-CM

## 2023-10-24 DIAGNOSIS — R06.81 WITNESSED EPISODE OF APNEA: ICD-10-CM

## 2023-10-24 PROCEDURE — 95810 POLYSOM 6/> YRS 4/> PARAM: CPT | Performed by: FAMILY MEDICINE

## 2023-10-24 PROCEDURE — 95810 POLYSOM 6/> YRS 4/> PARAM: CPT

## 2023-10-30 ENCOUNTER — TELEPHONE (OUTPATIENT)
Dept: SLEEP MEDICINE | Facility: HOSPITAL | Age: 58
End: 2023-10-30
Payer: OTHER GOVERNMENT